# Patient Record
Sex: MALE | Race: WHITE | NOT HISPANIC OR LATINO | Employment: FULL TIME | URBAN - METROPOLITAN AREA
[De-identification: names, ages, dates, MRNs, and addresses within clinical notes are randomized per-mention and may not be internally consistent; named-entity substitution may affect disease eponyms.]

---

## 2021-01-18 ENCOUNTER — APPOINTMENT (OUTPATIENT)
Dept: CT IMAGING | Facility: HOSPITAL | Age: 31
End: 2021-01-18

## 2021-01-18 ENCOUNTER — HOSPITAL ENCOUNTER (EMERGENCY)
Facility: HOSPITAL | Age: 31
Discharge: HOME | End: 2021-01-18
Attending: EMERGENCY MEDICINE | Admitting: EMERGENCY MEDICINE

## 2021-01-18 VITALS
OXYGEN SATURATION: 98 % | WEIGHT: 293 LBS | BODY MASS INDEX: 39.68 KG/M2 | HEART RATE: 78 BPM | TEMPERATURE: 98.2 F | DIASTOLIC BLOOD PRESSURE: 91 MMHG | SYSTOLIC BLOOD PRESSURE: 121 MMHG | RESPIRATION RATE: 18 BRPM | HEIGHT: 72 IN

## 2021-01-18 DIAGNOSIS — R53.1 EPISODE OF GENERALIZED WEAKNESS: ICD-10-CM

## 2021-01-18 DIAGNOSIS — M79.601 BILATERAL ARM PAIN: ICD-10-CM

## 2021-01-18 DIAGNOSIS — M79.602 BILATERAL ARM PAIN: ICD-10-CM

## 2021-01-18 DIAGNOSIS — R51.9 NONINTRACTABLE EPISODIC HEADACHE, UNSPECIFIED HEADACHE TYPE: Primary | ICD-10-CM

## 2021-01-18 LAB
ALBUMIN SERPL-MCNC: 4.2 G/DL (ref 3.5–5.2)
ALBUMIN/GLOB SERPL: 1.4 G/DL
ALP SERPL-CCNC: 74 U/L (ref 39–117)
ALT SERPL W P-5'-P-CCNC: 31 U/L (ref 1–41)
ANION GAP SERPL CALCULATED.3IONS-SCNC: 8.5 MMOL/L (ref 5–15)
AST SERPL-CCNC: 22 U/L (ref 1–40)
BASOPHILS # BLD AUTO: 0.03 10*3/MM3 (ref 0–0.2)
BASOPHILS NFR BLD AUTO: 0.4 % (ref 0–1.5)
BILIRUB SERPL-MCNC: 0.5 MG/DL (ref 0–1.2)
BILIRUB UR QL STRIP: NEGATIVE
BUN SERPL-MCNC: 14 MG/DL (ref 6–20)
BUN/CREAT SERPL: 11.8 (ref 7–25)
CALCIUM SPEC-SCNC: 9.2 MG/DL (ref 8.6–10.5)
CHLORIDE SERPL-SCNC: 105 MMOL/L (ref 98–107)
CLARITY UR: CLEAR
CO2 SERPL-SCNC: 24.5 MMOL/L (ref 22–29)
COLOR UR: YELLOW
CREAT SERPL-MCNC: 1.19 MG/DL (ref 0.76–1.27)
DEPRECATED RDW RBC AUTO: 40.2 FL (ref 37–54)
EOSINOPHIL # BLD AUTO: 0.06 10*3/MM3 (ref 0–0.4)
EOSINOPHIL NFR BLD AUTO: 0.8 % (ref 0.3–6.2)
ERYTHROCYTE [DISTWIDTH] IN BLOOD BY AUTOMATED COUNT: 12 % (ref 12.3–15.4)
GFR SERPL CREATININE-BSD FRML MDRD: 71 ML/MIN/1.73
GLOBULIN UR ELPH-MCNC: 3 GM/DL
GLUCOSE SERPL-MCNC: 98 MG/DL (ref 65–99)
GLUCOSE UR STRIP-MCNC: NEGATIVE MG/DL
HCT VFR BLD AUTO: 47.3 % (ref 37.5–51)
HGB BLD-MCNC: 16.3 G/DL (ref 13–17.7)
HGB UR QL STRIP.AUTO: NEGATIVE
IMM GRANULOCYTES # BLD AUTO: 0.01 10*3/MM3 (ref 0–0.05)
IMM GRANULOCYTES NFR BLD AUTO: 0.1 % (ref 0–0.5)
KETONES UR QL STRIP: NEGATIVE
LEUKOCYTE ESTERASE UR QL STRIP.AUTO: NEGATIVE
LYMPHOCYTES # BLD AUTO: 2.29 10*3/MM3 (ref 0.7–3.1)
LYMPHOCYTES NFR BLD AUTO: 30.9 % (ref 19.6–45.3)
MCH RBC QN AUTO: 31.5 PG (ref 26.6–33)
MCHC RBC AUTO-ENTMCNC: 34.5 G/DL (ref 31.5–35.7)
MCV RBC AUTO: 91.3 FL (ref 79–97)
MONOCYTES # BLD AUTO: 0.58 10*3/MM3 (ref 0.1–0.9)
MONOCYTES NFR BLD AUTO: 7.8 % (ref 5–12)
NEUTROPHILS NFR BLD AUTO: 4.43 10*3/MM3 (ref 1.7–7)
NEUTROPHILS NFR BLD AUTO: 60 % (ref 42.7–76)
NITRITE UR QL STRIP: NEGATIVE
NRBC BLD AUTO-RTO: 0 /100 WBC (ref 0–0.2)
PH UR STRIP.AUTO: 5.5 [PH] (ref 4.5–8)
PLATELET # BLD AUTO: 228 10*3/MM3 (ref 140–450)
PMV BLD AUTO: 10.6 FL (ref 6–12)
POTASSIUM SERPL-SCNC: 3.9 MMOL/L (ref 3.5–5.2)
PROT SERPL-MCNC: 7.2 G/DL (ref 6–8.5)
PROT UR QL STRIP: NEGATIVE
RBC # BLD AUTO: 5.18 10*6/MM3 (ref 4.14–5.8)
SODIUM SERPL-SCNC: 138 MMOL/L (ref 136–145)
SP GR UR STRIP: 1.01 (ref 1–1.03)
UROBILINOGEN UR QL STRIP: NORMAL
WBC # BLD AUTO: 7.4 10*3/MM3 (ref 3.4–10.8)

## 2021-01-18 PROCEDURE — 85025 COMPLETE CBC W/AUTO DIFF WBC: CPT | Performed by: PHYSICIAN ASSISTANT

## 2021-01-18 PROCEDURE — 70450 CT HEAD/BRAIN W/O DYE: CPT

## 2021-01-18 PROCEDURE — 81003 URINALYSIS AUTO W/O SCOPE: CPT | Performed by: PHYSICIAN ASSISTANT

## 2021-01-18 PROCEDURE — 99283 EMERGENCY DEPT VISIT LOW MDM: CPT | Performed by: PHYSICIAN ASSISTANT

## 2021-01-18 PROCEDURE — 80053 COMPREHEN METABOLIC PANEL: CPT | Performed by: PHYSICIAN ASSISTANT

## 2021-01-18 PROCEDURE — 99283 EMERGENCY DEPT VISIT LOW MDM: CPT

## 2021-01-18 RX ORDER — SODIUM CHLORIDE 0.9 % (FLUSH) 0.9 %
10 SYRINGE (ML) INJECTION AS NEEDED
Status: DISCONTINUED | OUTPATIENT
Start: 2021-01-18 | End: 2021-01-18 | Stop reason: HOSPADM

## 2021-01-18 NOTE — DISCHARGE INSTRUCTIONS
Return to the emergency department with worsening symptoms, or as needed with emergent concerns.  Please follow-up with PCP for further evaluation of the symptoms with MRI, and possibly neurology referral as well.

## 2021-01-18 NOTE — ED PROVIDER NOTES
" EMERGENCY DEPARTMENT ENCOUNTER      Room Number: 07/07    History is provided by the patient, no translation services needed    HPI:    Chief complaint: Dizziness, weakness, headaches, upper extremity pain    Location: Occipital headache    Quality/Severity: 1/10, dull    Timing/Duration: Since noon today, weakness, upper extremity pain, and dizziness seem to have subsided right now.    Modifying Factors: None.  Patient did try to see his PCP today but was sent here for further evaluation.    Associated Symptoms: Positive for dizziness, weakness, headaches, upper extremity pain.  Patient denies syncope, lightheadedness, chest pain, shortness of breath, cough, fever, chills, abdominal pain, dysuria, urgency to urinate, or urinary retention, constipation or diarrhea.  He denies any numbness in upper or lower extremities, denies any facial numbness, vision loss, slurred speech, or facial droop.    Narrative: Pt is a 30 y.o. male who presents complaining of the above symptoms.  He has a PMH significant for \"having 4 kidneys\", (duplex kidneys?) and had two separate nephrectomies in 2011 and 2016 of the kidneys on the right.  Patient states he has been seeing his provider in regards to headaches, and some short-term memory difficulty over the past few months.  He states over the last month he feels like his memory has been deteriorating.  He states his long-term memory is very much intact.  Patient reports being at home today around noon and coughed and felt a pain in his \"occipital lobes\" that also traveled down his arms bilaterally.  He states after that he was carrying a laptop up the stairs when he felt like he did not have control of his arms.  He states at that time he also felt weak in his legs and felt like he did not have control of his legs either and ended up falling on his knees.  He did not injure anything in the fall and does not have any pain.  He did not have a head injury.  He states after this happened " he still felt unsteady, and he was still having a headache.  He states in the past when he is sneezed or coughed he is experienced a headache that only lasts for a few minutes.  He states the headache is very mild in nature and is barely there however it is still concerning to him.  He went to see his PCP today and was told to come to the emergency department.  He states he had an MRI of his cervical spine back in September that was normal, he saw his PCP for headaches after sneezing back in October, and had an MRI of the brain with and without contrast ordered, however his insurance did not cover it.  He states his PCP is working on getting a brain MRI scheduled for him soon.      PMD: Marbella Johnson, MANJULA    REVIEW OF SYSTEMS  Review of Systems   Constitutional: Positive for fatigue. Negative for chills and fever.   HENT: Positive for sneezing. Negative for nosebleeds and rhinorrhea.    Eyes: Negative for photophobia, pain and visual disturbance.   Respiratory: Negative for cough and shortness of breath.    Cardiovascular: Negative for chest pain and palpitations.   Gastrointestinal: Negative for abdominal pain, constipation, diarrhea, nausea and vomiting.   Genitourinary: Negative for difficulty urinating, dysuria and frequency.   Musculoskeletal: Positive for gait problem and myalgias. Negative for arthralgias and joint swelling.   Skin: Negative for pallor and wound.   Neurological: Positive for weakness and headaches. Negative for dizziness, syncope and numbness.   Psychiatric/Behavioral: Negative for confusion. The patient is not nervous/anxious.          PAST MEDICAL HISTORY  Active Ambulatory Problems     Diagnosis Date Noted   • No Active Ambulatory Problems     Resolved Ambulatory Problems     Diagnosis Date Noted   • No Resolved Ambulatory Problems     Past Medical History:   Diagnosis Date   • Renal disorder        PAST SURGICAL HISTORY  Past Surgical History:   Procedure Laterality Date   •  "NEPHRECTOMY  2011, 2016       FAMILY HISTORY  History reviewed. No pertinent family history.    SOCIAL HISTORY  Social History     Socioeconomic History   • Marital status:      Spouse name: Not on file   • Number of children: Not on file   • Years of education: Not on file   • Highest education level: Not on file   Tobacco Use   • Smoking status: Former Smoker   Substance and Sexual Activity   • Alcohol use: Yes     Comment: \"rarely\"   • Drug use: Never   • Sexual activity: Defer       ALLERGIES  Patient has no known allergies.      Current Facility-Administered Medications:   •  [COMPLETED] Insert peripheral IV, , , Once **AND** sodium chloride 0.9 % flush 10 mL, 10 mL, Intravenous, PRN, Jemima Keen PA-C  No current outpatient medications on file.    PHYSICAL EXAM  ED Triage Vitals [01/18/21 1525]   Temp Heart Rate Resp BP SpO2   98.2 °F (36.8 °C) 78 22 (!) 161/110 98 %      Temp src Heart Rate Source Patient Position BP Location FiO2 (%)   Temporal Monitor Sitting Left arm --       Physical Exam   Constitutional: He is oriented to person, place, and time.   Patient is obese per BMI.  He is in no acute distress and is nontoxic in appearance.   HENT:   Right Ear: Hearing, tympanic membrane, external ear and ear canal normal.   Left Ear: Hearing, external ear and ear canal normal. Tympanic membrane is not injected and not erythematous. A middle ear effusion (Serous) is present.   Eyes: Pupils are equal, round, and reactive to light. Conjunctivae and EOM are normal.   Cardiovascular: Normal rate, regular rhythm, normal heart sounds and intact distal pulses.   Pulmonary/Chest: Effort normal and breath sounds normal.   Abdominal: Soft. Bowel sounds are normal. There is no abdominal tenderness. There is no guarding.   Musculoskeletal: Normal range of motion.         General: No deformity or edema.   Neurological: He is alert and oriented to person, place, and time. He has normal motor skills, normal " sensation and intact cranial nerves. He displays no weakness, facial symmetry and normal speech. He exhibits normal muscle tone. He has a normal Finger-Nose-Finger Test and a normal Heel to Wyatt Test. He shows no pronator drift. Gait normal. Coordination normal. GCS score is 15.   Patient's exam does show some inconsistencies.  When initially evaluating hand grasps they were 3/5 bilaterally and patient stated he was squeezing as hard as he could, I still had fingers placed in patient's fists when I asked him to push and pull with biceps to check strength and his grasps increased to 5/5 bilaterally.  The remainder of his neuro exam showed completely normal muscle strength and tone bilaterally.   Skin: Skin is warm and dry.   Psychiatric: Mood, memory, affect and judgment normal.   Patient expresses that his memory has been abnormal, however he was able to recall events from today without difficulty.         LAB RESULTS  Lab Results (last 24 hours)     Procedure Component Value Units Date/Time    CBC & Differential [[id number]]  (Abnormal) Collected: 01/18/21 1613    Specimen: Blood Updated: 01/18/21 1623    Narrative:      The following orders were created for panel order CBC & Differential.  Procedure                               Abnormality         Status                     ---------                               -----------         ------                     CBC Auto Differential[[id number]]        Abnormal            Final result                 Please view results for these tests on the individual orders.    Comprehensive Metabolic Panel [[id number]] Collected: 01/18/21 1613    Specimen: Blood Updated: 01/18/21 1643     Glucose 98 mg/dL      BUN 14 mg/dL      Creatinine 1.19 mg/dL      Sodium 138 mmol/L      Potassium 3.9 mmol/L      Chloride 105 mmol/L      CO2 24.5 mmol/L      Calcium 9.2 mg/dL      Total Protein 7.2 g/dL      Albumin 4.20 g/dL      ALT (SGPT) 31 U/L      AST (SGOT) 22 U/L      Alkaline  Phosphatase 74 U/L      Total Bilirubin 0.5 mg/dL      eGFR Non African Amer 71 mL/min/1.73      Globulin 3.0 gm/dL      A/G Ratio 1.4 g/dL      BUN/Creatinine Ratio 11.8     Anion Gap 8.5 mmol/L     Narrative:      GFR Normal >60  Chronic Kidney Disease <60  Kidney Failure <15      CBC Auto Differential [[id number]]  (Abnormal) Collected: 01/18/21 1613    Specimen: Blood Updated: 01/18/21 1623     WBC 7.40 10*3/mm3      RBC 5.18 10*6/mm3      Hemoglobin 16.3 g/dL      Hematocrit 47.3 %      MCV 91.3 fL      MCH 31.5 pg      MCHC 34.5 g/dL      RDW 12.0 %      RDW-SD 40.2 fl      MPV 10.6 fL      Platelets 228 10*3/mm3      Neutrophil % 60.0 %      Lymphocyte % 30.9 %      Monocyte % 7.8 %      Eosinophil % 0.8 %      Basophil % 0.4 %      Immature Grans % 0.1 %      Neutrophils, Absolute 4.43 10*3/mm3      Lymphocytes, Absolute 2.29 10*3/mm3      Monocytes, Absolute 0.58 10*3/mm3      Eosinophils, Absolute 0.06 10*3/mm3      Basophils, Absolute 0.03 10*3/mm3      Immature Grans, Absolute 0.01 10*3/mm3      nRBC 0.0 /100 WBC     Urinalysis With Microscopic If Indicated (No Culture) - Urine, Clean Catch [[id number]]  (Normal) Collected: 01/18/21 1650    Specimen: Urine, Clean Catch Updated: 01/18/21 1700     Color, UA Yellow     Appearance, UA Clear     pH, UA 5.5     Specific Gravity, UA 1.015     Glucose, UA Negative     Ketones, UA Negative     Bilirubin, UA Negative     Blood, UA Negative     Protein, UA Negative     Leuk Esterase, UA Negative     Nitrite, UA Negative     Urobilinogen, UA 0.2 E.U./dL    Narrative:      Urine microscopic not indicated.            I ordered the above labs and reviewed the results    RADIOLOGY  Ct Head Without Contrast    Result Date: 1/18/2021  CT HEAD, NONCONTRAST, 1/18/2021 HISTORY: 30-year-old male in the ED complaining of sudden onset dizziness today. Fall without head injury. Some mental status changes after the incident. He notes headaches over the last 4 months. TECHNIQUE: CT  imaging of the head without IV contrast. Radiation dose reduction techniques included automated exposure control. Radiation audit for CT and nuclear cardiology exams in the last 12 months: 0. FINDINGS: No acute intracranial abnormality is demonstrated. No evidence of intracranial hemorrhage, cerebral edema, mass effect, extra-axial fluid collection or hydrocephalus. The visualized upper paranasal sinuses and mastoid air spaces are clear. Note is made of subtle asymmetry of the cavernous sinuses with the right being somewhat prominent and more dense than the left. While this is likely incidental normal variation, consider future follow up MRI of the sella for further characterization.     1.  No acute intracranial abnormality. 2.  Subtle cavernous sinus asymmetry as discussed above. Recommend follow-up nonemergent MRI examination. Signer Name: Glen Alfaro MD  Signed: 1/18/2021 5:09 PM  Workstation Name: [id number]  [facility]      I ordered the above radiologic testing and reviewed the results    PROCEDURES  Procedures      PROGRESS AND CONSULTS  ED Course as of Jan 18 1748   Mon Jan 18, 2021   1617 Covid swab discontinued.  When patient arrived triage note said patient did not feel well, when I asked the triage nurse if he should be in Covid precautions, she said possibly.  This was ordered to initiate isolation protocol, and then after further discussion with patient it sounds like most of these problems are actually quite chronic with some worsening today.  He has no Covid symptoms except for occasional headache which is chronic as well.    [KS]   1744 Patient has had no recurrence of symptoms while in the emergency department.  His labs and imaging do not reveal any acute findings.  The radiologist did note a very subtle asymmetry of the cavernous sinuses, right greater than left and likely a normal variant.  He suggested outpatient evaluation with nonemergent MRI for further  evaluation of this finding.  I discussed with patient that he should follow-up with PCP for further evaluation of the symptoms.  Discussed return to ER warnings.  Patient verbalizes understanding and is agreeable with this plan.    [KS]      ED Course User Index  [KS] Jemima Keen, DANYELLE           MEDICAL DECISION MAKING  Results were reviewed/discussed with the patient and they were also made aware of online access. Pt also made aware that some labs, such as cultures, will not be resulted during ER visit and follow up with PMD is necessary.     MDM      NIHSS (NIH Stroke Scale/Score) reviewed and/or performed as part of the patient evaluation and treatment planning process.  The result associated with this review/performance is: 0      My differential diagnosis for headache includes but is not limited to:  Migraine, cluster, ischemic stroke, subarachnoid hemorrhage, intracranial hemorrhage, vascular malformation, cerebral aneurysm, vascular dissection, vasculitis, temporal arteritis, malignant hypertension, pheochromocytoma, cerebral venous thrombosis, preeclampsia; bacterial meningitis, viral meningitis, fungal meningitis, encephalitis, brain abscess, pleural empyema, sinusitis, dental infection, influenza, viral syndrome; carbon monoxide exposure, analgesic abuse, hypoglycemia; trigeminal neuralgia, postherpetic neuralgia, occipital neuralgia; subdural hematoma, concussion, musculoskeletal tension, cervical osteoarthritis; glaucoma, TMJ disease, pseudotumor cerebri, post LP headache, intracranial neoplasm, sleep apnea      DIAGNOSIS  Final diagnoses:   Nonintractable episodic headache, unspecified headache type   Bilateral arm pain   Episode of generalized weakness       Latest Documented Vital Signs:  As of 17:48 EST  BP- 121/91 HR- 78 Temp- 98.2 °F (36.8 °C) (Temporal) O2 sat- 98%    DISPOSITION  Patient discharged in care of wife.    Discussed pertinent labs and imaging findings with the patient/family.   Patient/Family voiced understanding of need to follow-up for recheck, further testing as needed.  Return to the emergency Department warnings were given.         Medication List      No changes were made to your prescriptions during this visit.             Follow-up Information     Marbella Johnson APRN. Call in 1 day.    Specialty: Emergency Medicine  Why: To schedule follow-up appointment  Contact information:  [address] [place] [id number] [phone]                     Dictated utilizing Dragon dictation     Jemima Keen PA-C  01/18/21 1942

## 2021-01-18 NOTE — ED NOTES
"Patient to ED with reports of dizziness and occipital headache since noon today. Also reports pain in bilateral arms with sneezing. Denies currently. Patient reports generalized weakness that has since resolved.  strong & equal bilaterally. Pupils PERRLA. Heart rate and rhythm regular. Patient denies chest pain or shortness of breath. States \"stumbled\" while walking earlier today and went down on knee. Denies pain or difficulty with ROM. Patient ambulatory with steady gait. Patient also reports having \"memory issues\" for past month. States I remember things from the distant past but not recent things. Patient alert & oriented x4. Will continue to monitor.      Nena Leggett RN  01/18/21 1832    "

## 2021-01-18 NOTE — ED NOTES
Pt's wife Antonette Saravia called requesting an update.  [phone]     Benja Gray  01/18/21 2841

## 2021-03-27 ENCOUNTER — HOSPITAL ENCOUNTER (EMERGENCY)
Facility: HOSPITAL | Age: 31
Discharge: HOME | End: 2021-03-27
Attending: EMERGENCY MEDICINE | Admitting: EMERGENCY MEDICINE

## 2021-03-27 VITALS
DIASTOLIC BLOOD PRESSURE: 100 MMHG | HEART RATE: 81 BPM | TEMPERATURE: 98.1 F | SYSTOLIC BLOOD PRESSURE: 154 MMHG | OXYGEN SATURATION: 98 % | WEIGHT: 288.2 LBS | BODY MASS INDEX: 39.04 KG/M2 | HEIGHT: 72 IN | RESPIRATION RATE: 18 BRPM

## 2021-03-27 DIAGNOSIS — M54.9 OTHER ACUTE BACK PAIN: ICD-10-CM

## 2021-03-27 DIAGNOSIS — R03.0 ELEVATED BLOOD PRESSURE READING: Primary | ICD-10-CM

## 2021-03-27 PROCEDURE — 25010000002 KETOROLAC TROMETHAMINE PER 15 MG: Performed by: PHYSICIAN ASSISTANT

## 2021-03-27 PROCEDURE — 96372 THER/PROPH/DIAG INJ SC/IM: CPT

## 2021-03-27 PROCEDURE — 99283 EMERGENCY DEPT VISIT LOW MDM: CPT

## 2021-03-27 RX ORDER — CYCLOBENZAPRINE HCL 10 MG
10 TABLET ORAL 3 TIMES DAILY PRN
Qty: 14 TABLET | Refills: 0 | OUTPATIENT
Start: 2021-03-27 | End: 2022-06-03

## 2021-03-27 RX ORDER — KETOROLAC TROMETHAMINE 30 MG/ML
30 INJECTION, SOLUTION INTRAMUSCULAR; INTRAVENOUS EVERY 6 HOURS PRN
Status: DISCONTINUED | OUTPATIENT
Start: 2021-03-27 | End: 2021-03-27 | Stop reason: HOSPADM

## 2021-03-27 RX ORDER — NAPROXEN 500 MG/1
500 TABLET ORAL 2 TIMES DAILY WITH MEALS
Qty: 14 TABLET | Refills: 0 | Status: SHIPPED | OUTPATIENT
Start: 2021-03-27 | End: 2021-04-03

## 2021-03-27 RX ORDER — CYCLOBENZAPRINE HCL 10 MG
10 TABLET ORAL ONCE
Status: COMPLETED | OUTPATIENT
Start: 2021-03-27 | End: 2021-03-27

## 2021-03-27 RX ADMIN — CYCLOBENZAPRINE 10 MG: 10 TABLET, FILM COATED ORAL at 14:12

## 2021-03-27 RX ADMIN — KETOROLAC TROMETHAMINE 30 MG: 30 INJECTION, SOLUTION INTRAMUSCULAR; INTRAVENOUS at 14:12

## 2021-09-17 ENCOUNTER — HOSPITAL ENCOUNTER (EMERGENCY)
Facility: HOSPITAL | Age: 31
Discharge: HOME | End: 2021-09-17
Attending: EMERGENCY MEDICINE | Admitting: EMERGENCY MEDICINE

## 2021-09-17 ENCOUNTER — APPOINTMENT (OUTPATIENT)
Dept: ULTRASOUND IMAGING | Facility: HOSPITAL | Age: 31
End: 2021-09-17

## 2021-09-17 VITALS
HEART RATE: 75 BPM | BODY MASS INDEX: 38.74 KG/M2 | WEIGHT: 286 LBS | DIASTOLIC BLOOD PRESSURE: 100 MMHG | TEMPERATURE: 98.1 F | RESPIRATION RATE: 18 BRPM | OXYGEN SATURATION: 97 % | HEIGHT: 72 IN | SYSTOLIC BLOOD PRESSURE: 143 MMHG

## 2021-09-17 DIAGNOSIS — N45.1 EPIDIDYMITIS: Primary | ICD-10-CM

## 2021-09-17 LAB
BILIRUB UR QL STRIP: NEGATIVE
CLARITY UR: CLEAR
COLOR UR: YELLOW
GLUCOSE UR STRIP-MCNC: NEGATIVE MG/DL
HGB UR QL STRIP.AUTO: NEGATIVE
KETONES UR QL STRIP: NEGATIVE
LEUKOCYTE ESTERASE UR QL STRIP.AUTO: NEGATIVE
NITRITE UR QL STRIP: NEGATIVE
PH UR STRIP.AUTO: 5.5 [PH] (ref 4.5–8)
PROT UR QL STRIP: NEGATIVE
SP GR UR STRIP: 1.03 (ref 1–1.03)
UROBILINOGEN UR QL STRIP: NORMAL

## 2021-09-17 PROCEDURE — 81003 URINALYSIS AUTO W/O SCOPE: CPT | Performed by: EMERGENCY MEDICINE

## 2021-09-17 PROCEDURE — 93976 VASCULAR STUDY: CPT

## 2021-09-17 PROCEDURE — 87591 N.GONORRHOEAE DNA AMP PROB: CPT | Performed by: EMERGENCY MEDICINE

## 2021-09-17 PROCEDURE — 99283 EMERGENCY DEPT VISIT LOW MDM: CPT

## 2021-09-17 PROCEDURE — 99283 EMERGENCY DEPT VISIT LOW MDM: CPT | Performed by: EMERGENCY MEDICINE

## 2021-09-17 PROCEDURE — 87491 CHLMYD TRACH DNA AMP PROBE: CPT | Performed by: EMERGENCY MEDICINE

## 2021-09-17 PROCEDURE — 76870 US EXAM SCROTUM: CPT

## 2021-09-17 PROCEDURE — 96372 THER/PROPH/DIAG INJ SC/IM: CPT

## 2021-09-17 PROCEDURE — 25010000002 CEFTRIAXONE PER 250 MG: Performed by: EMERGENCY MEDICINE

## 2021-09-17 RX ORDER — SODIUM CHLORIDE 0.9 % (FLUSH) 0.9 %
10 SYRINGE (ML) INJECTION AS NEEDED
Status: DISCONTINUED | OUTPATIENT
Start: 2021-09-17 | End: 2021-09-17

## 2021-09-17 RX ORDER — DOXYCYCLINE 100 MG/1
100 CAPSULE ORAL 2 TIMES DAILY
Qty: 20 CAPSULE | Refills: 0 | Status: SHIPPED | OUTPATIENT
Start: 2021-09-17 | End: 2021-09-27

## 2021-09-17 RX ORDER — DOXYCYCLINE 100 MG/1
100 CAPSULE ORAL ONCE
Status: COMPLETED | OUTPATIENT
Start: 2021-09-17 | End: 2021-09-17

## 2021-09-17 RX ORDER — IBUPROFEN 400 MG/1
800 TABLET ORAL ONCE
Status: DISCONTINUED | OUTPATIENT
Start: 2021-09-17 | End: 2021-09-17

## 2021-09-17 RX ORDER — HYDROCODONE BITARTRATE AND ACETAMINOPHEN 5; 325 MG/1; MG/1
1 TABLET ORAL ONCE
Status: COMPLETED | OUTPATIENT
Start: 2021-09-17 | End: 2021-09-17

## 2021-09-17 RX ADMIN — DOXYCYCLINE 100 MG: 100 CAPSULE ORAL at 22:53

## 2021-09-17 RX ADMIN — HYDROCODONE BITARTRATE AND ACETAMINOPHEN 1 TABLET: 5; 325 TABLET ORAL at 22:23

## 2021-09-17 RX ADMIN — CEFTRIAXONE SODIUM 250 MG: 250 INJECTION, POWDER, FOR SOLUTION INTRAMUSCULAR; INTRAVENOUS at 22:53

## 2021-09-17 NOTE — ED PROVIDER NOTES
Subjective   History of Present Illness  History of Present Illness    Chief complaint: Testicle pain    Location: Right testicle    Quality/Severity: Moderate, sharp    Timing/Onset/Duration: Gradual onset since 10 AM    Modifying Factors: Hurts to touch and move, feels better to remain still    Associated Symptoms: Patient had some nausea but no vomiting. No fever chills. No blood in the urine or urethral discharge. No fever or chills. No abdominal pain.    Narrative: This 31-year-old white male states that he had intercourse this morning. At 10 AM he began developing right testicle pain. The pain is gotten progressively worse.    PCP:Marbella Johnson APRN      Review of Systems   Constitutional: Negative for chills and fever.   Gastrointestinal: Positive for nausea. Negative for vomiting.   Genitourinary: Positive for testicular pain. Negative for decreased urine volume, difficulty urinating, discharge, hematuria, penile pain, penile swelling, scrotal swelling and urgency.   Skin: Negative for rash.        Medication List      ASK your doctor about these medications    cyclobenzaprine 10 MG tablet  Commonly known as: FLEXERIL  Take 1 tablet by mouth 3 (Three) Times a Day As Needed for Muscle Spasms for up to 14 doses.            Past Medical History:   Diagnosis Date   • Cardiac arrest (CMS/HCC)    • Hard to intubate    • Renal disorder     Pt born with 4 kidneys, 2 removed [id number]       Allergies   Allergen Reactions   • Morphine Itching       Past Surgical History:   Procedure Laterality Date   • ADENOIDECTOMY     • NEPHRECTOMY  2011, 2016   • TONSILLECTOMY         History reviewed. No pertinent family history.    Social History     Socioeconomic History   • Marital status:      Spouse name: Not on file   • Number of children: Not on file   • Years of education: Not on file   • Highest education level: Not on file   Tobacco Use   • Smoking status: Former Smoker   • Smokeless tobacco: Never Used  "  Substance and Sexual Activity   • Alcohol use: Yes     Comment: \"rarely\"   • Drug use: Never   • Sexual activity: Defer           Objective   Physical Exam  Vitals (The temperature is 99.6, heart rate 111, respiration 16, /102, room air pulse ox 97%.) and nursing note reviewed.   Constitutional:       General: He is not in acute distress.     Appearance: Normal appearance. He is not ill-appearing.   Abdominal:      General: Abdomen is flat. Bowel sounds are normal. There is no distension.      Palpations: Abdomen is soft. There is no mass.      Tenderness: There is no abdominal tenderness. There is no right CVA tenderness, left CVA tenderness, guarding or rebound.      Hernia: No hernia is present.   Genitourinary:     Comments: There is tenderness upon palpation of the right epididymis. There is no hernia. There is no urethral discharge. There is no rash the testicles are of normal lie with normal cremasteric reflex. There is no masses.  Skin:     General: Skin is warm and dry.      Findings: No rash.   Neurological:      General: No focal deficit present.      Mental Status: He is alert and oriented to person, place, and time.         Procedures           ED Course  ED Course as of Sep 17 2227   Fri Sep 17, 2021   2226 The urinalysis is negative    [RC]      ED Course User Index  [RC] Zain Trejo MD           22:27 EDT, 09/17/21:  The patient was reassessed.  He has no new complaints.  His vital signs reviewed and are stable abdominal exam: Soft nontender no masses positive bowel sounds    22:27 EDT, 09/17/21:  The patient's diagnosis of epididymitis was discussed with him.  The patient will be given a prescription for doxycycline and a shot of Rocephin.  The patient should ice his testicles for 20 minutes every 2 hours while he is awake for 2 to 3 days.  He should cover the bag ice with a washcloth.  The patient should take Motrin as needed as directed for pain the patient should follow-up with Dr." Shawn next week.  He should return the emergency department if he has increased pain, fever, worse in any way at all.  All the patient question answered he will be discharged in good condition.  The chlamydia, trichomonas, Neisseria gonorrhea PCR urine confirmation is pending at the time of discharge                                MDM    Final diagnoses:   Epididymitis       ED Disposition  ED Disposition     None          No follow-up provider specified.       Medication List      No changes were made to your prescriptions during this visit.          Zain Trejo MD  09/17/21 3166

## 2021-09-18 NOTE — DISCHARGE INSTRUCTIONS
Ice the testicles and elevate for 20 minutes every 2 hours while awake for 2 to 3 days.  Place a washrag on the ice bag between your testicles and the ice bag.  Take Motrin as needed as directed for pain.  Follow-up with Dr. Escobar in 1 week.  Return the emergency department if there is increased pain, fever, worse in any way at all

## 2021-09-21 LAB
C TRACH RRNA SPEC QL NAA+PROBE: NEGATIVE
N GONORRHOEA RRNA SPEC QL NAA+PROBE: NEGATIVE

## 2021-09-30 ENCOUNTER — TRANSCRIBE ORDERS (OUTPATIENT)
Dept: ADMINISTRATIVE | Facility: HOSPITAL | Age: 31
End: 2021-09-30

## 2021-09-30 DIAGNOSIS — Q62.5 DUPLICATION OF URETER: Primary | ICD-10-CM

## 2021-10-21 ENCOUNTER — APPOINTMENT (OUTPATIENT)
Dept: ULTRASOUND IMAGING | Facility: HOSPITAL | Age: 31
End: 2021-10-21

## 2022-02-22 ENCOUNTER — HOSPITAL ENCOUNTER (EMERGENCY)
Facility: HOSPITAL | Age: 32
Discharge: HOME | End: 2022-02-22
Attending: EMERGENCY MEDICINE | Admitting: EMERGENCY MEDICINE

## 2022-02-22 VITALS
DIASTOLIC BLOOD PRESSURE: 99 MMHG | HEIGHT: 72 IN | BODY MASS INDEX: 39.28 KG/M2 | HEART RATE: 112 BPM | RESPIRATION RATE: 20 BRPM | TEMPERATURE: 98.7 F | SYSTOLIC BLOOD PRESSURE: 131 MMHG | WEIGHT: 290 LBS | OXYGEN SATURATION: 98 %

## 2022-02-22 DIAGNOSIS — L02.91 ABSCESS: Primary | ICD-10-CM

## 2022-02-22 PROCEDURE — 99282 EMERGENCY DEPT VISIT SF MDM: CPT | Performed by: PHYSICIAN ASSISTANT

## 2022-02-22 PROCEDURE — 99282 EMERGENCY DEPT VISIT SF MDM: CPT

## 2022-02-22 RX ORDER — SULFAMETHOXAZOLE AND TRIMETHOPRIM 800; 160 MG/1; MG/1
1 TABLET ORAL 2 TIMES DAILY
COMMUNITY

## 2022-02-22 NOTE — ED PROVIDER NOTES
" EMERGENCY DEPARTMENT ENCOUNTER      Room Number: 08/08    History is provided by the patient, no translation services needed    HPI:    Chief complaint: abscess    Narrative: Pt is a 31 y.o. male who presents complaining of an abscess on his right thigh.  Reports he saw his primary care provider yesterday who started him on Bactrim.  States since then the area has become more firm and tender.  Reports the redness has not been spreading although due to the changes in his symptoms he wanted to come in and get evaluated.  He reports no fevers.  No other areas are swollen.  No history of any cancer.  No difficulties urination.  No injuries or trauma to that area.  No other complaints.      PMD: Marbella Johnson, MANJULA    REVIEW OF SYSTEMS  Review of Systems  Complete review of systems performed otherwise negative except pertinent positives per HPI.    PAST MEDICAL HISTORY  Active Ambulatory Problems     Diagnosis Date Noted   • No Active Ambulatory Problems     Resolved Ambulatory Problems     Diagnosis Date Noted   • No Resolved Ambulatory Problems     Past Medical History:   Diagnosis Date   • Cardiac arrest (HCC)    • Hard to intubate    • Renal disorder        PAST SURGICAL HISTORY  Past Surgical History:   Procedure Laterality Date   • ADENOIDECTOMY     • NEPHRECTOMY  2011, 2016   • TONSILLECTOMY         FAMILY HISTORY  History reviewed. No pertinent family history.    SOCIAL HISTORY  Social History     Socioeconomic History   • Marital status:    Tobacco Use   • Smoking status: Former Smoker   • Smokeless tobacco: Never Used   Substance and Sexual Activity   • Alcohol use: Yes     Comment: \"rarely\"   • Drug use: Never   • Sexual activity: Defer       ALLERGIES  Morphine    No current facility-administered medications for this encounter.    Current Outpatient Medications:   •  sulfamethoxazole-trimethoprim (BACTRIM DS,SEPTRA DS) 800-160 MG per tablet, Take 1 tablet by mouth 2 (Two) Times a Day., Disp: , " Rfl:   •  cyclobenzaprine (FLEXERIL) 10 MG tablet, Take 1 tablet by mouth 3 (Three) Times a Day As Needed for Muscle Spasms for up to 14 doses., Disp: 14 tablet, Rfl: 0    PHYSICAL EXAM  ED Triage Vitals [02/22/22 1601]   Temp Heart Rate Resp BP SpO2   98.7 °F (37.1 °C) 112 20 (!) 161/120 98 %      Temp src Heart Rate Source Patient Position BP Location FiO2 (%)   Oral Monitor -- -- --       Physical Exam  Vitals and nursing note reviewed.   Constitutional:       Appearance: Normal appearance. He is normal weight. He is not ill-appearing or toxic-appearing.   HENT:      Head: Normocephalic and atraumatic.      Nose: Nose normal.      Mouth/Throat:      Mouth: Mucous membranes are moist.   Eyes:      Extraocular Movements: Extraocular movements intact.      Conjunctiva/sclera: Conjunctivae normal.      Pupils: Pupils are equal, round, and reactive to light.   Cardiovascular:      Rate and Rhythm: Normal rate and regular rhythm.      Pulses: Normal pulses.   Pulmonary:      Effort: Pulmonary effort is normal.   Abdominal:      General: Abdomen is flat.      Palpations: Abdomen is soft.      Tenderness: There is no abdominal tenderness.   Musculoskeletal:         General: Normal range of motion.      Cervical back: Normal range of motion.   Skin:     General: Skin is warm.      Capillary Refill: Capillary refill takes less than 2 seconds.      Coloration: Skin is not pale.          Neurological:      General: No focal deficit present.      Mental Status: He is alert and oriented to person, place, and time.   Psychiatric:         Mood and Affect: Mood normal.           LAB RESULTS  Lab Results (last 24 hours)     ** No results found for the last 24 hours. **            I ordered the above labs and reviewed the results    RADIOLOGY  No Radiology Exams Resulted Within Past 24 Hours    I ordered the above radiologic testing and reviewed the results    PROCEDURES  Procedures      PROGRESS AND CONSULTS           MEDICAL  DECISION MAKING    MDM     31-year-old female seen and evaluated for abscess in the left groin.  He was seen by primary care yesterday and was started on Bactrim.  Since then he reports his symptoms have changed and that the area has become more indurated and tender with palpation.  On arrival vitals patient is hypertensive 161/120, he is afebrile heart rate of 112, and 98% on room air.     On exam patient does have a quarter sized area of redness that is indurated and tender to palpation.  Also has increased warmth.  However patient did draw a [place] around this and ink yesterday and the redness does not surpass those borders.  Additionally he has no area of fluctuance to drain.  There are no other areas of firmness suggestive for lymphadenopathy in the groin.    As patient is already on Bactrim for treatment I think he just needs to give this area more time as there is nothing to drain today and it will likely improve or come to ahead in time where he can drain it at home.  I did give him appropriate directions for how to do this and he voiced understanding.  Patient was discharged home in stable condition with appropriate follow-up and return instructions.    DIAGNOSIS  Final diagnoses:   Abscess       Latest Documented Vital Signs:  As of 16:22 EST  BP- 131/99 HR- 112 Temp- 98.7 °F (37.1 °C) (Oral) O2 sat- 98%    DISPOSITION    Discussed pertinent findings with the patient/family.  Patient/Family voiced understanding of need to follow-up for recheck and further testing as needed.  Return to the Emergency Department warnings were given.         Medication List      No changes were made to your prescriptions during this visit.              Follow-up Information     Call  Marbella Johnson, APRN.    Specialty: Emergency Medicine  Why: To schedule follow up appointment as needed  Contact information:  [address] [place] [address] [phone]                           Dictated utilizing Dragon  dictation     Evy Louise PA-C  02/22/22 2140

## 2022-02-22 NOTE — DISCHARGE INSTRUCTIONS
Return with worsening symptoms or if you develop a fever.  Anticipate this will likely drain in the next couple of days and your symptoms will start to resolve.

## 2022-06-03 ENCOUNTER — HOSPITAL ENCOUNTER (EMERGENCY)
Facility: HOSPITAL | Age: 32
Discharge: HOME | End: 2022-06-03
Admitting: EMERGENCY MEDICINE

## 2022-06-03 ENCOUNTER — APPOINTMENT (OUTPATIENT)
Dept: CT IMAGING | Facility: HOSPITAL | Age: 32
End: 2022-06-03

## 2022-06-03 VITALS
TEMPERATURE: 98.4 F | HEART RATE: 82 BPM | WEIGHT: 287 LBS | BODY MASS INDEX: 38.92 KG/M2 | DIASTOLIC BLOOD PRESSURE: 96 MMHG | RESPIRATION RATE: 16 BRPM | OXYGEN SATURATION: 98 % | SYSTOLIC BLOOD PRESSURE: 139 MMHG

## 2022-06-03 DIAGNOSIS — R79.89 ELEVATED SERUM CREATININE: ICD-10-CM

## 2022-06-03 DIAGNOSIS — M54.50 ACUTE LEFT-SIDED LOW BACK PAIN WITHOUT SCIATICA: Primary | ICD-10-CM

## 2022-06-03 LAB
ANION GAP SERPL CALCULATED.3IONS-SCNC: 11.4 MMOL/L (ref 5–15)
BASOPHILS # BLD AUTO: 0.05 10*3/MM3 (ref 0–0.2)
BASOPHILS NFR BLD AUTO: 0.7 % (ref 0–1.5)
BILIRUB UR QL STRIP: NEGATIVE
BUN SERPL-MCNC: 18 MG/DL (ref 6–20)
BUN/CREAT SERPL: 12.7 (ref 7–25)
CALCIUM SPEC-SCNC: 9.8 MG/DL (ref 8.6–10.5)
CHLORIDE SERPL-SCNC: 102 MMOL/L (ref 98–107)
CLARITY UR: CLEAR
CO2 SERPL-SCNC: 24.6 MMOL/L (ref 22–29)
COLOR UR: YELLOW
CREAT SERPL-MCNC: 1.42 MG/DL (ref 0.76–1.27)
DEPRECATED RDW RBC AUTO: 42.3 FL (ref 37–54)
EGFRCR SERPLBLD CKD-EPI 2021: 67.3 ML/MIN/1.73
EOSINOPHIL # BLD AUTO: 0.06 10*3/MM3 (ref 0–0.4)
EOSINOPHIL NFR BLD AUTO: 0.8 % (ref 0.3–6.2)
ERYTHROCYTE [DISTWIDTH] IN BLOOD BY AUTOMATED COUNT: 12.9 % (ref 12.3–15.4)
GLUCOSE SERPL-MCNC: 95 MG/DL (ref 65–99)
GLUCOSE UR STRIP-MCNC: NEGATIVE MG/DL
HCT VFR BLD AUTO: 46 % (ref 37.5–51)
HGB BLD-MCNC: 16 G/DL (ref 13–17.7)
HGB UR QL STRIP.AUTO: NEGATIVE
IMM GRANULOCYTES # BLD AUTO: 0.01 10*3/MM3 (ref 0–0.05)
IMM GRANULOCYTES NFR BLD AUTO: 0.1 % (ref 0–0.5)
KETONES UR QL STRIP: NEGATIVE
LEUKOCYTE ESTERASE UR QL STRIP.AUTO: NEGATIVE
LYMPHOCYTES # BLD AUTO: 2.09 10*3/MM3 (ref 0.7–3.1)
LYMPHOCYTES NFR BLD AUTO: 27.8 % (ref 19.6–45.3)
MCH RBC QN AUTO: 31.4 PG (ref 26.6–33)
MCHC RBC AUTO-ENTMCNC: 34.8 G/DL (ref 31.5–35.7)
MCV RBC AUTO: 90.2 FL (ref 79–97)
MONOCYTES # BLD AUTO: 0.59 10*3/MM3 (ref 0.1–0.9)
MONOCYTES NFR BLD AUTO: 7.8 % (ref 5–12)
NEUTROPHILS NFR BLD AUTO: 4.73 10*3/MM3 (ref 1.7–7)
NEUTROPHILS NFR BLD AUTO: 62.8 % (ref 42.7–76)
NITRITE UR QL STRIP: NEGATIVE
NRBC BLD AUTO-RTO: 0 /100 WBC (ref 0–0.2)
PH UR STRIP.AUTO: 5.5 [PH] (ref 4.5–8)
PLATELET # BLD AUTO: 218 10*3/MM3 (ref 140–450)
PMV BLD AUTO: 10.8 FL (ref 6–12)
POTASSIUM SERPL-SCNC: 3.8 MMOL/L (ref 3.5–5.2)
PROT UR QL STRIP: NEGATIVE
RBC # BLD AUTO: 5.1 10*6/MM3 (ref 4.14–5.8)
SODIUM SERPL-SCNC: 138 MMOL/L (ref 136–145)
SP GR UR STRIP: 1.02 (ref 1–1.03)
UROBILINOGEN UR QL STRIP: NORMAL
WBC NRBC COR # BLD: 7.53 10*3/MM3 (ref 3.4–10.8)

## 2022-06-03 PROCEDURE — 81003 URINALYSIS AUTO W/O SCOPE: CPT | Performed by: EMERGENCY MEDICINE

## 2022-06-03 PROCEDURE — 99283 EMERGENCY DEPT VISIT LOW MDM: CPT

## 2022-06-03 PROCEDURE — 99282 EMERGENCY DEPT VISIT SF MDM: CPT | Performed by: EMERGENCY MEDICINE

## 2022-06-03 PROCEDURE — 74176 CT ABD & PELVIS W/O CONTRAST: CPT

## 2022-06-03 PROCEDURE — 80048 BASIC METABOLIC PNL TOTAL CA: CPT | Performed by: EMERGENCY MEDICINE

## 2022-06-03 PROCEDURE — 85025 COMPLETE CBC W/AUTO DIFF WBC: CPT | Performed by: EMERGENCY MEDICINE

## 2022-06-03 RX ORDER — CYCLOBENZAPRINE HCL 10 MG
10 TABLET ORAL 3 TIMES DAILY PRN
Qty: 21 TABLET | Refills: 0 | Status: SHIPPED | OUTPATIENT
Start: 2022-06-03 | End: 2022-06-10

## 2022-06-03 RX ORDER — SODIUM CHLORIDE 0.9 % (FLUSH) 0.9 %
10 SYRINGE (ML) INJECTION AS NEEDED
Status: DISCONTINUED | OUTPATIENT
Start: 2022-06-03 | End: 2022-06-03 | Stop reason: HOSPADM

## 2022-06-03 RX ADMIN — SODIUM CHLORIDE 500 ML: 9 INJECTION, SOLUTION INTRAVENOUS at 18:17

## 2022-06-03 NOTE — DISCHARGE INSTRUCTIONS
Medication as directed.  Recommend you take Tylenol for pain.  Be Profen, Aleve and all NSAIDs can cause kidney damage.  Thus recommend you avoid those in the future as much as possible.  Follow-up with your PCP as needed.  Follow-up with urology as above.  Return to ED for medical emergencies.

## 2022-06-03 NOTE — ED PROVIDER NOTES
Subjective   Gallo Saravia is a 31-year-old white male who presents secondary to left low back pain.  Patient had onset of pain last night when he was putting his children to bed.  The pain was sudden onset.  It was so severe it made Mr. Saravia bend over at his waist.  The pain is waxed and waned.  At its most intense patient will drop to the ground and pain.  Associated nausea and vomiting today as well as difficulty urinating.  Patient has had similar pain in the past secondary to renal issues.  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  Prior issues with bladder spasms as well.  No history of kidney stones.  Patient is not under the care of urologist at this time.  Patient presents for evaluation.    Patient is vaccinated x2 for COVID-19.      History provided by:  Patient      Review of Systems   Constitutional: Negative for fever.   HENT: Negative for rhinorrhea.    Eyes: Negative for redness.   Respiratory: Negative for cough.    Cardiovascular: Negative for chest pain.   Gastrointestinal: Negative for abdominal pain.   Genitourinary: Positive for decreased urine volume (Since last night). Negative for difficulty urinating and dysuria.   Musculoskeletal: Positive for back pain (Left low back pain since last night.).   Skin: Negative for rash.   Neurological: Negative for syncope.   All other systems reviewed and are negative.      Past Medical History:   Diagnosis Date   • Cardiac arrest (HCC)    • Hard to intubate    • Renal disorder     Pt born with 4 kidneys, 2 removed [id number]       Allergies   Allergen Reactions   • Morphine Itching       Past Surgical History:   Procedure Laterality Date   • ADENOIDECTOMY     • NEPHRECTOMY  2011, 2016   • TONSILLECTOMY         History reviewed. No pertinent family history.    Social History     Socioeconomic History   • Marital status:    Tobacco Use   • Smoking status: Former Smoker   • Smokeless tobacco:  "Never Used   Substance and Sexual Activity   • Alcohol use: Yes     Comment: \"rarely\"   • Drug use: Never   • Sexual activity: Defer           Objective   Physical Exam  Vitals and nursing note reviewed.   Constitutional:       General: He is not in acute distress.     Appearance: Normal appearance. He is well-developed. He is not ill-appearing, toxic-appearing or diaphoretic.      Comments: 31-year-old white male laying in bed.  Patient is a bit overweight.  He otherwise appears in good health.  Vital signs notable for /96.  Otherwise unremarkable.  Patient friendly and cooperative.   HENT:      Head: Normocephalic and atraumatic.      Right Ear: Tympanic membrane, ear canal and external ear normal.      Left Ear: Tympanic membrane, ear canal and external ear normal.      Nose: Nose normal.      Mouth/Throat:      Mouth: Mucous membranes are moist.      Pharynx: Oropharynx is clear.   Eyes:      Extraocular Movements: Extraocular movements intact.      Conjunctiva/sclera: Conjunctivae normal.      Pupils: Pupils are equal, round, and reactive to light.   Cardiovascular:      Rate and Rhythm: Normal rate and regular rhythm.      Pulses: Normal pulses.      Heart sounds: Normal heart sounds. No murmur heard.    No friction rub. No gallop.   Pulmonary:      Effort: Pulmonary effort is normal. No respiratory distress.      Breath sounds: Normal breath sounds. No stridor. No wheezing or rales.   Abdominal:      General: Abdomen is protuberant. Bowel sounds are normal. There is no distension.      Palpations: Abdomen is soft. There is no mass.      Tenderness: There is no abdominal tenderness. There is no guarding or rebound.      Hernia: No hernia is present.   Musculoskeletal:         General: Tenderness present. No swelling, deformity or signs of injury.      Cervical back: Normal, normal range of motion and neck supple.      Thoracic back: Normal.      Lumbar back: Tenderness present. No swelling, edema, " "deformity, signs of trauma or bony tenderness. Decreased range of motion (Secondary to pain).        Back:    Skin:     General: Skin is warm and dry.      Findings: No erythema or rash.   Neurological:      General: No focal deficit present.      Mental Status: He is alert and oriented to person, place, and time.      Cranial Nerves: No cranial nerve deficit.      Deep Tendon Reflexes: Reflexes are normal and symmetric.   Psychiatric:         Mood and Affect: Mood normal.         Behavior: Behavior normal.         Procedures           ED Course  ED Course as of 06/04/22 0202   Fri Jun 03, 2022   1542 Obtaining baseline labs, urinalysis and noncontrasted CT to evaluate for possible kidney stone.  Patient declined offer for pain and nausea medications. [SS]   1852 CBC unremarkable.  CMP shows elevated creatinine 1.42.  UA unremarkable.  CT unremarkable.  Patient given 500 cc bolus secondary to elevated creatinine.  I feel patient's back pain is musculoskeletal in nature.  I discussed with patient that he should take Tylenol for pain rather than NSAIDs.  Patient states that he \"lives on ibuprofen\".  This could be the cause of patient's elevated creatinine.  Discussed with him NSAIDs and potential renal damage.  Will prescribe muscle relaxants for home.  Giving urology for follow-up as patient does not currently have a urologist.  All questions answered.  Will DC home.    Prescription  1-Flexeril [SS]      ED Course User Index  [SS] Feliz Ramirez MD      Labs Reviewed   BASIC METABOLIC PANEL - Abnormal; Notable for the following components:       Result Value    Creatinine 1.42 (*)     All other components within normal limits    Narrative:     GFR Normal >60  Chronic Kidney Disease <60  Kidney Failure <15     URINALYSIS W/ MICROSCOPIC IF INDICATED (NO CULTURE) - Normal    Narrative:     Urine microscopic not indicated.   CBC WITH AUTO DIFFERENTIAL - Normal   CBC AND DIFFERENTIAL    Narrative:     The following " orders were created for panel order CBC & Differential.  Procedure                               Abnormality         Status                     ---------                               -----------         ------                     CBC Auto Differential[[id number]]        Normal              Final result                 Please view results for these tests on the individual orders.     CT Abdomen Pelvis Without Contrast    Result Date: 6/3/2022  Narrative: INDICATION: Flank pain. Kidney stones suspected. Left flank pain started one day ago. Right kidney removed and 7 kidney surgeries TECHNIQUE: CT of the abdomen and pelvis without contrast. Coronal and sagittal reconstructions were obtained.  Radiation dose reduction techniques included automated exposure control or exposure modulation based on body size. Radiation audit for number of CT and nuclear cardiology exams performed in the last year: 0.  COMPARISON: None available. FINDINGS: Lung bases: Clear Abdomen: Lack of IV contrast media limits evaluation for pathology other than urinary tract calculus disease. Noncontrast liver, spleen, pancreas, adrenal glands and gallbladder are unremarkable. The graft post right nephrectomy. No intrarenal calculus on the left. No left-sided hydronephrosis. There is a small amount of focal posterior pararenal fat stranding on the left. The ureteral calculus. No bladder calculus. Please see aware that this examination is not sensitive for pyelonephritis. Please correlate further clinically. There is a normal appearance to the appendix. There is no evidence for bowel obstruction. There is no free intraperitoneal air. Pelvis: There are small bilateral fat-containing inguinal hernias. Bladder is largely decompressed. No free fluid in the pelvis.     Impression: 1. No evidence for urinary tract calculus disease. 2. Patient has had a previous right-sided nephrectomy. Please note the noncontrast study would not be sensitive for  pyelonephritis if this is a clinical concern. 3. Fat-containing inguinal hernias. Signer Name: Macey Ewing MD  Signed: 6/3/2022 5:01 PM  Workstation Name: [email or web address]  [facility]    My differential diagnosis for back pain includes but is not limited to:  Musculoskeletal strain, contusion, retroperitoneal hematoma, disc protrusion, vertebral fracture, transverse process fracture, rib fracture, facet syndrome, sacroiliac joint strain, sciatica, renal injury, splenic injury, pancreatic injury, osteoarthritis, lumbar spondylosis, spinal stenosis, ankylosing spondylitis, sacroiliac joint inflammation, pancreatitis, perforated peptic ulcer, diverticulitis, endometriosis, chronic PID, epidural abscess, osteomyelitis, retroperitoneal abscess, pyelonephritis, pneumonia, subphrenic abscess, tuberculosis, neurofibroma, meningioma, multiple myeloma, lymphoma, metastatic cancer, primary cancer, AAA, aortic dissection, spinal ischemia, referred pain, ureterolithiasis                                             MDM    Final diagnoses:   Acute left-sided low back pain without sciatica   Elevated serum creatinine       ED Disposition  ED Disposition     ED Disposition   Discharge    Condition   Stable    Comment   --             Avery Escobar MD  [address] [id number] [phone]    Schedule an appointment as soon as possible for a visit in 1 week  Sooner if needed    Gertrude Padilla, APRN  [address] [id number] [phone]    Schedule an appointment as soon as possible for a visit   As needed         Medication List      Changed    cyclobenzaprine 10 MG tablet  Commonly known as: FLEXERIL  Take 1 tablet by mouth 3 (Three) Times a Day As Needed for Muscle Spasms (neck and back pain.) for up to 7 days.  What changed: reasons to take this           Where to Get Your Medications      These medications were sent to [address], [place] - [address] [facility]  53 - [phone]  - [phone] FX [address] [address]    Phone: [phone]   · cyclobenzaprine 10 MG tablet          Feliz Ramirez MD  06/04/22 0207

## 2023-03-03 ENCOUNTER — HOSPITAL ENCOUNTER (EMERGENCY)
Facility: HOSPITAL | Age: 33
Discharge: HOME | End: 2023-03-03
Attending: EMERGENCY MEDICINE | Admitting: EMERGENCY MEDICINE
Payer: COMMERCIAL

## 2023-03-03 VITALS
OXYGEN SATURATION: 95 % | HEART RATE: 114 BPM | BODY MASS INDEX: 39.28 KG/M2 | WEIGHT: 290 LBS | SYSTOLIC BLOOD PRESSURE: 139 MMHG | HEIGHT: 72 IN | RESPIRATION RATE: 18 BRPM | TEMPERATURE: 99 F | DIASTOLIC BLOOD PRESSURE: 92 MMHG

## 2023-03-03 DIAGNOSIS — U07.1 COVID-19 VIRUS DETECTED: Primary | ICD-10-CM

## 2023-03-03 LAB
FLUAV RNA RESP QL NAA+PROBE: NOT DETECTED
FLUBV RNA RESP QL NAA+PROBE: NOT DETECTED
SARS-COV-2 RNA RESP QL NAA+PROBE: DETECTED

## 2023-03-03 PROCEDURE — 96374 THER/PROPH/DIAG INJ IV PUSH: CPT

## 2023-03-03 PROCEDURE — 25010000002 KETOROLAC TROMETHAMINE PER 15 MG: Performed by: EMERGENCY MEDICINE

## 2023-03-03 PROCEDURE — 99283 EMERGENCY DEPT VISIT LOW MDM: CPT

## 2023-03-03 PROCEDURE — 25010000002 ONDANSETRON PER 1 MG: Performed by: EMERGENCY MEDICINE

## 2023-03-03 PROCEDURE — 87636 SARSCOV2 & INF A&B AMP PRB: CPT | Performed by: EMERGENCY MEDICINE

## 2023-03-03 PROCEDURE — 96375 TX/PRO/DX INJ NEW DRUG ADDON: CPT

## 2023-03-03 RX ORDER — SODIUM CHLORIDE 0.9 % (FLUSH) 0.9 %
10 SYRINGE (ML) INJECTION AS NEEDED
Status: DISCONTINUED | OUTPATIENT
Start: 2023-03-03 | End: 2023-03-03 | Stop reason: HOSPADM

## 2023-03-03 RX ORDER — KETOROLAC TROMETHAMINE 30 MG/ML
30 INJECTION, SOLUTION INTRAMUSCULAR; INTRAVENOUS ONCE
Status: COMPLETED | OUTPATIENT
Start: 2023-03-03 | End: 2023-03-03

## 2023-03-03 RX ORDER — ONDANSETRON 8 MG/1
8 TABLET, ORALLY DISINTEGRATING ORAL EVERY 6 HOURS PRN
Qty: 20 TABLET | Refills: 0 | Status: SHIPPED | OUTPATIENT
Start: 2023-03-03

## 2023-03-03 RX ORDER — ONDANSETRON 2 MG/ML
4 INJECTION INTRAMUSCULAR; INTRAVENOUS ONCE
Status: COMPLETED | OUTPATIENT
Start: 2023-03-03 | End: 2023-03-03

## 2023-03-03 RX ADMIN — KETOROLAC TROMETHAMINE 30 MG: 30 INJECTION, SOLUTION INTRAMUSCULAR; INTRAVENOUS at 02:00

## 2023-03-03 RX ADMIN — ONDANSETRON 4 MG: 2 INJECTION INTRAMUSCULAR; INTRAVENOUS at 01:04

## 2023-03-03 NOTE — ED PROVIDER NOTES
"Subjective   History of Present Illness  Gallo Saravia is a 32-year-old white male who presents secondary to fever, headache and body aches.  Patient's initial symptom was a headache approximately 6:30 PM.  This was followed by fever up to 104.2 degrees.  Associated body aches.  Patient states initially it felt like his entire body was sunburned.  However now every joint aches.  Associated nausea but no vomiting.  No diarrhea.  Ill contact in the form of patient's spouse.  She had a fever yesterday.  However her symptoms milder than Mr. Marte.  Presents for evaluation.    Patient is vaccinated x2 for COVID-19.     History provided by:  Patient      Review of Systems   Constitutional: Positive for fever.   HENT: Negative for rhinorrhea.    Eyes: Negative for redness.   Respiratory: Negative for cough.    Cardiovascular: Negative for chest pain.   Gastrointestinal: Positive for nausea. Negative for abdominal pain.   Genitourinary: Negative for dysuria.   Musculoskeletal: Positive for arthralgias and myalgias. Negative for back pain.   Skin: Negative for rash.   Neurological: Negative for syncope.   All other systems reviewed and are negative.      Past Medical History:   Diagnosis Date   • Cardiac arrest (HCC)    • Hard to intubate    • Renal disorder     Pt born with 4 kidneys, 2 removed [id number]       Allergies   Allergen Reactions   • Morphine Itching       Past Surgical History:   Procedure Laterality Date   • ADENOIDECTOMY     • NEPHRECTOMY  2011, 2016   • TONSILLECTOMY         History reviewed. No pertinent family history.    Social History     Socioeconomic History   • Marital status:    Tobacco Use   • Smoking status: Former   • Smokeless tobacco: Never   Substance and Sexual Activity   • Alcohol use: Yes     Comment: \"rarely\"   • Drug use: Never   • Sexual activity: Defer           Objective   Physical Exam  Vitals and nursing note reviewed.   Constitutional:       General: He is not in acute " distress.     Appearance: Normal appearance. He is well-developed. He is ill-appearing (Mild). He is not toxic-appearing or diaphoretic.      Comments: 32-year-old white male lying in bed.  Patient is obese.  He appears mildly ill but nontoxic.  Vital signs notable for heart rate of 114.  Patient is friendly and cooperative.  Empty emesis bag line on patient's chest.  Mr. Saravia is wearing baby Yoda pajama bottoms.   HENT:      Head: Normocephalic and atraumatic.      Right Ear: Tympanic membrane, ear canal and external ear normal.      Left Ear: Tympanic membrane, ear canal and external ear normal.      Nose: Nose normal.      Mouth/Throat:      Mouth: Mucous membranes are moist.      Pharynx: Oropharynx is clear.   Eyes:      Extraocular Movements: Extraocular movements intact.      Conjunctiva/sclera: Conjunctivae normal.      Pupils: Pupils are equal, round, and reactive to light.   Cardiovascular:      Rate and Rhythm: Regular rhythm. Tachycardia present.      Pulses: Normal pulses.      Heart sounds: Normal heart sounds. No murmur heard.    No friction rub. No gallop.   Pulmonary:      Effort: Pulmonary effort is normal. No respiratory distress.      Breath sounds: Normal breath sounds. No stridor. No wheezing or rales.   Abdominal:      General: There is no distension.      Palpations: Abdomen is soft. There is no mass.      Tenderness: There is no abdominal tenderness. There is no guarding or rebound.      Hernia: No hernia is present.   Musculoskeletal:         General: Normal range of motion.      Cervical back: Normal range of motion and neck supple.   Skin:     General: Skin is warm and dry.      Findings: No erythema or rash.   Neurological:      General: No focal deficit present.      Mental Status: He is alert and oriented to person, place, and time.      Cranial Nerves: No cranial nerve deficit.      Deep Tendon Reflexes: Reflexes are normal and symmetric.   Psychiatric:         Mood and Affect:  "Mood normal.         Behavior: Behavior normal.         Procedures           ED Course  ED Course as of 03/03/23 0211   Fri Mar 03, 2023   0100 Obtaining COVID and flu swab.  Giving Zofran 4 mg IV. [SS]   0101 Patient is positive for COVID-19.  Negative for influenza. [SS]   0150 Discussed results and treatment with patient.  He requested medication for the \"migraine headache\".  I explained that headache is a common feature of COVID.  Explained that we will not be able to completely resolve patient's headache.  We will give patient a dose of Toradol prior to discharge.  Nausea is improved with Zofran. [SS]      ED Course User Index  [SS] Feliz Ramirez MD      Labs Reviewed   COVID-19 AND FLU A/B, NP SWAB IN TRANSPORT MEDIA 8-12 HR TAT - Abnormal; Notable for the following components:       Result Value    COVID19 Detected (*)     All other components within normal limits    Narrative:     Fact sheet for providers: https://www.fda.gov/media/696302/download    Fact sheet for patients: https://www.fda.gov/media/036765/download    Test performed by PCR.  Influenza A and Influenza B negative results should be considered presumptive in samples that have a positive SARS-CoV-2 result.    Competitive inhibition studies showed that SARS-CoV-2 virus, when present at concentrations above 3.6E+04 copies/mL, can inhibit the detection and amplification of influenza A and influenza B virus RNA if present at or below 1.8E+02 copies/mL or 4.9E+02 copies/mL, respectively, and may lead to false negative influenza virus results. If co-infection with influenza A or influenza B virus is suspected in samples with a positive SARS-CoV-2 result, the sample should be re-tested with another FDA cleared, approved, or authorized influenza test, if influenza virus detection would change clinical management.     My differential diagnosis for fever includes but is not limited:  To viral infections including COVID-19, bacterial infections, fungal " infections, fever of unknown origin, auto regulatory dysfunction, hyperthermia, heat exhaustion, heat stroke, malignant neuroleptic syndrome and others.                                       MDM    Final diagnoses:   COVID-19 virus detected       ED Disposition  ED Disposition     ED Disposition   Discharge    Condition   Stable    Comment   --             Gertrude Padilla, APRN  [address] [facility] [address] [phone]    Schedule an appointment as soon as possible for a visit in 1 week  for continued or worsened symptoms, Sooner if needed         Medication List      New Prescriptions    ondansetron ODT 8 MG disintegrating tablet  Commonly known as: ZOFRAN-ODT  Place 1 tablet on the tongue Every 6 (Six) Hours As Needed for Nausea or Vomiting for up to 20 doses.           Where to Get Your Medications      These medications were sent to [facility] [id number] - [place] - [address] [facility] [address] - [phone] [facility] [phone] FX  [address] [facility] [address]    Phone: [phone]   · ondansetron ODT 8 MG disintegrating tablet          Feliz Ramirez MD  03/03/23 0215

## 2023-03-03 NOTE — DISCHARGE INSTRUCTIONS
You have COVID-19.  Recommend plenty of fluids and rest.  Tylenol and/or ibuprofen as needed for fever, body aches, headache.  Medication as prescribed.  Isolate from other family members as above.  Anyone exposed to you should quarantine per CDC recommendations.  Go to CDC.gov for current recommendations.   Follow-up with your PCP as above.  Return to ED for medical emergencies.